# Patient Record
Sex: FEMALE | Race: WHITE | NOT HISPANIC OR LATINO | URBAN - METROPOLITAN AREA
[De-identification: names, ages, dates, MRNs, and addresses within clinical notes are randomized per-mention and may not be internally consistent; named-entity substitution may affect disease eponyms.]

---

## 2019-03-10 ENCOUNTER — EMERGENCY (EMERGENCY)
Facility: HOSPITAL | Age: 64
LOS: 1 days | Discharge: HOME | End: 2019-03-10
Admitting: EMERGENCY MEDICINE
Payer: COMMERCIAL

## 2019-03-10 VITALS
DIASTOLIC BLOOD PRESSURE: 68 MMHG | HEART RATE: 65 BPM | RESPIRATION RATE: 16 BRPM | TEMPERATURE: 99 F | OXYGEN SATURATION: 98 % | SYSTOLIC BLOOD PRESSURE: 109 MMHG

## 2019-03-10 DIAGNOSIS — G44.309 POST-TRAUMATIC HEADACHE, UNSPECIFIED, NOT INTRACTABLE: ICD-10-CM

## 2019-03-10 DIAGNOSIS — Z88.1 ALLERGY STATUS TO OTHER ANTIBIOTIC AGENTS STATUS: ICD-10-CM

## 2019-03-10 DIAGNOSIS — M54.2 CERVICALGIA: ICD-10-CM

## 2019-03-10 DIAGNOSIS — R51 HEADACHE: ICD-10-CM

## 2019-03-10 PROCEDURE — 70450 CT HEAD/BRAIN W/O DYE: CPT | Mod: 26

## 2019-03-10 PROCEDURE — 72125 CT NECK SPINE W/O DYE: CPT | Mod: 26

## 2019-03-10 PROCEDURE — 99284 EMERGENCY DEPT VISIT MOD MDM: CPT | Mod: 25

## 2019-03-10 PROCEDURE — 99284 EMERGENCY DEPT VISIT MOD MDM: CPT

## 2019-03-10 PROCEDURE — 70450 CT HEAD/BRAIN W/O DYE: CPT

## 2019-03-10 PROCEDURE — 72125 CT NECK SPINE W/O DYE: CPT

## 2019-03-10 RX ORDER — ACETAMINOPHEN 500 MG
1000 TABLET ORAL ONCE
Qty: 0 | Refills: 0 | Status: COMPLETED | OUTPATIENT
Start: 2019-03-10 | End: 2019-03-10

## 2019-03-10 RX ADMIN — Medication 1000 MILLIGRAM(S): at 19:07
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
## 2019-03-10 NOTE — ED PROVIDER NOTE - NSFOLLOWUPINSTRUCTIONS_ED_ALL_ED_FT
Please arrange for follow up with an endocrinologist.  If you decide to arrange for it in [facility], please call the ED REFERRAL COORDINATOR for assistance tomorrow.  phone [phone]  Return to ED for any new/worsening symptoms, or any concerns.    ______________________________________________________________________  Post-Concussion Syndrome  Post-concussion syndrome describes the symptoms that can occur after a head injury. These symptoms can last from weeks to months.    What are the causes?  It is not clear why some head injuries cause post-concussion syndrome. It can occur whether your head injury was mild or severe and whether you were wearing head protection or not.    What are the signs or symptoms?  Memory difficulties.  Dizziness.  Headaches.  Double vision or blurry vision.  Sensitivity to light.  Hearing difficulties.  Depression.  Tiredness.  Weakness.  Difficulty with concentration.  Difficulty sleeping or staying asleep.  Vomiting.  Poor balance or instability on your feet.  Slow reaction time.  ImageDifficulty learning and remembering things you have heard.  How is this diagnosed?  There is no test to determine whether you have post-concussion syndrome. Your health care provider may order an imaging scan of your brain, such as a CT scan, to check for other problems that may be causing your symptoms (such as a severe injury inside your skull).    How is this treated?  Usually, these problems disappear over time without medical care. Your health care provider may prescribe medicine to help ease your symptoms. It is important to follow up with a neurologist to evaluate your recovery and address any lingering symptoms or issues.    Follow these instructions at home:  Take medicines only as directed by your health care provider. Do not take aspirin. Aspirin can slow blood clotting.  Sleep with your head slightly elevated to help with headaches.  Avoid any situation where there is potential for another head injury. This includes football, hockey, soccer, basketball, martial arts, downhill snow sports, and horseback riding. Your condition will get worse every time you experience a concussion. You should avoid these activities until you are evaluated by the appropriate follow-up health care providers.  Keep all follow-up visits as directed by your health care provider. This is important.  Contact a health care provider if:  You have increased problems paying attention or concentrating.  You have increased difficulty remembering or learning new information.  You need more time to complete tasks or assignments than before.  You have increased irritability or decreased ability to cope with stress.  You have more symptoms than before.  Seek medical care if you have any of the following symptoms for more than two weeks after your injury:    Lasting (chronic) headaches.  Dizziness or balance problems.  Nausea.  Vision problems.  Increased sensitivity to noise or light.  Depression or mood swings.  Anxiety or irritability.  Memory problems.  Difficulty concentrating or paying attention.  Sleep problems.  Feeling tired all the time.    Get help right away if:  You have confusion or unusual drowsiness.  Others find it difficult to wake you up.  You have nausea or persistent, forceful vomiting.  You feel like you are moving when you are not (vertigo). Your eyes may move rapidly back and forth.  You have convulsions or faint.  You have severe, persistent headaches that are not relieved by medicine.  You cannot use your arms or legs normally.  One of your pupils is larger than the other.  You have clear or bloody discharge from your nose or ears.  Your problems are getting worse, not better.  This information is not intended to replace advice given to you by your health care provider. Make sure you discuss any questions you have with your health care provider.  _________________________________________________________  THYROID NODULES - AfterCare(R) Instructions(ER/ED)     Thyroid Nodules    WHAT YOU NEED TO KNOW:    Thyroid nodules are growths on your thyroid gland. Your thyroid makes hormones that help control your body temperature, heart rate, and growth. The hormones also control how fast your body uses food for energy. Some nodules are lumps of tissue, and others are filled with fluid.     DISCHARGE INSTRUCTIONS:    Medicines:     Thyroid medicine is given to bring your thyroid hormone levels back to a normal range.       Take your medicine as directed. Contact your healthcare provider if you think your medicine is not helping or if you have side effects. Tell him or her if you are allergic to any medicine. Keep a list of the medicines, vitamins, and herbs you take. Include the amounts, and when and why you take them. Bring the list or the pill bottles to follow-up visits. Carry your medicine list with you in case of an emergency.    Follow up with your healthcare provider as directed: You may need frequent blood tests to check your thyroid hormone levels. You may also need tests such as an ultrasound to check if any nodules are growing or have returned. Write down your questions so you remember to ask them during your visits.    Eat iodine-rich foods: Examples include fish, seaweed, dairy products, eggs, beans, and lean meat. Iodized salt also contains iodine. You may need to use iodized table salt when you cook and season your food. Iodine may be added to bread or to your drinking water. Ask for a list of foods that contain iodine, and ask how much iodine you need each day.    Contact your healthcare provider if:     You have a new cough that does not improve.      You begin choking or have new or increased trouble swallowing.       Your voice becomes hoarse.      You are losing weight without trying.      You have questions or concerns about your condition or care.    Return to the emergency department if:     You have sudden chest pain or trouble breathing.      Your symptoms worsen, even after you take your medicines.

## 2019-03-10 NOTE — ED ADULT NURSE NOTE - CHPI ED NUR SYMPTOMS NEG
no change in level of consciousness/no nausea/no vomiting/no blurred vision/no confusion/no fever/no numbness/no weakness/no loss of consciousness

## 2019-03-10 NOTE — ED PROVIDER NOTE - PHYSICAL EXAMINATION
CONSTITUTIONAL: WD,WN. NAD.    SKIN: Normal color and turgor. No rash.    HEAD: NC/AT.  EYES: Conjunctiva clear. EOMI. PERRL.    ENT: Airway patent, OP without erythema, tonsillar swelling or exudate; uvula midline without swelling. Nasal mucosa clear, no rhinorrhea.  No otorrhea or hemotympanum.  RESPIRATORY:  Breathing non-labored. No retractions or accessory muscle use.  Lungs CTA bilat.  CARDIOVASCULAR:  RRR, S1S2. No M/R/G.   No neck bruit.   GI:  Abdomen soft, nontender.    MSK: Neck supple with FROM.  No midline neck tenderness.  No extremity edema or tenderness.  No joint swelling or ROM limitation.  NEURO: Alert and oriented; CN II-XII intact. Speech clear. 5/5 strength in all extremities.  Normal balance and gait. Romberg neg.  F-N normal.
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
## 2019-03-10 NOTE — ED PROVIDER NOTE - NS ED ROS FT
NEURO: No dizziness, no numbness in face or extremities  EYE: no change in vision or eye injury   ENT: no epistaxis, rhinorrhea, otorrhea, dental injury; tinnitus or hearing loss   PULM: no difficulty breathing   CV: no chest pain or palpitations; no syncope   GI: no abdominal pain or vomiting   : no pelvic pain, no genital injury   MSK: HPI  SKIN: no abrasions or lacerations

## 2019-12-14 ENCOUNTER — OFFICE VISIT (OUTPATIENT)
Dept: URGENT CARE | Facility: CLINIC | Age: 64
End: 2019-12-14
Payer: COMMERCIAL

## 2019-12-14 VITALS
OXYGEN SATURATION: 95 % | WEIGHT: 120 LBS | HEART RATE: 78 BPM | HEIGHT: 64 IN | BODY MASS INDEX: 20.49 KG/M2 | RESPIRATION RATE: 16 BRPM | TEMPERATURE: 98.7 F | SYSTOLIC BLOOD PRESSURE: 120 MMHG | DIASTOLIC BLOOD PRESSURE: 70 MMHG

## 2019-12-14 DIAGNOSIS — R21 RASH: ICD-10-CM

## 2019-12-14 PROCEDURE — 99203 OFFICE O/P NEW LOW 30 MIN: CPT | Performed by: PHYSICIAN ASSISTANT

## 2019-12-14 RX ORDER — ERGOCALCIFEROL 1.25 MG/1
CAPSULE ORAL
COMMUNITY
Start: 2017-03-22 | End: 2021-03-21

## 2019-12-14 RX ORDER — RANITIDINE 300 MG/1
300 TABLET ORAL
COMMUNITY
Start: 2019-04-24

## 2019-12-14 RX ORDER — PHENOL 1.4 %
600 AEROSOL, SPRAY (ML) MUCOUS MEMBRANE
COMMUNITY

## 2019-12-14 RX ORDER — MULTIVIT WITH MINERALS/LUTEIN
1 TABLET ORAL
COMMUNITY

## 2019-12-14 RX ORDER — TRIAMCINOLONE ACETONIDE 55 UG/1
SPRAY, METERED NASAL
COMMUNITY
Start: 2019-10-07

## 2019-12-14 RX ORDER — HYDROXYZINE HYDROCHLORIDE 25 MG/1
25 TABLET, FILM COATED ORAL 2 TIMES DAILY PRN
Qty: 30 TAB | Refills: 0 | Status: SHIPPED | OUTPATIENT
Start: 2019-12-14

## 2019-12-14 RX ORDER — TRIAMCINOLONE ACETONIDE 55 UG/1
2 SPRAY, METERED NASAL
COMMUNITY
Start: 2019-10-07

## 2019-12-14 RX ORDER — TRIAMCINOLONE ACETONIDE 1 MG/G
1 CREAM TOPICAL 2 TIMES DAILY
Qty: 1 TUBE | Refills: 1 | Status: SHIPPED | OUTPATIENT
Start: 2019-12-14

## 2019-12-14 RX ORDER — FUROSEMIDE 20 MG/1
10 TABLET ORAL
COMMUNITY
Start: 2018-07-30

## 2019-12-14 RX ORDER — FLUTICASONE PROPIONATE 50 MCG
SPRAY, SUSPENSION (ML) NASAL
COMMUNITY
Start: 2016-05-23 | End: 2020-05-22

## 2019-12-14 RX ORDER — DEXAMETHASONE 4 MG/1
TABLET ORAL
COMMUNITY
Start: 2017-03-06

## 2019-12-14 ASSESSMENT — ENCOUNTER SYMPTOMS
COUGH: 0
TINGLING: 0
NAUSEA: 0
FATIGUE: 0
SHORTNESS OF BREATH: 0
CHILLS: 0
FOCAL WEAKNESS: 0
MYALGIAS: 0
VOMITING: 0
SENSORY CHANGE: 0
HEADACHES: 0
FEVER: 0

## 2019-12-15 NOTE — PROGRESS NOTES
"Subjective:      Judie Currie is a 64 y.o. female who presents with Bug Bite (bed bugs )            Rash   This is a new problem. Episode onset: 3-4 days ago. Patient states she was exposed to bed bugs at local hotel ([place]) The problem is unchanged. The rash is diffuse (back, legs, chest ). The rash is characterized by itchiness and redness. She was exposed to an insect bite/sting (possible bed bugs.). Pertinent negatives include no cough, facial edema, fatigue, fever, shortness of breath or vomiting. (No chills) Past treatments include nothing.     The patient states she never saw a bug. She states she was moved to a different room. Her clothes and bags were all sent to the laundry. She states she has not had any more bumps.    No past medical history on file.    No past surgical history on file.    No family history on file.    Allergies   Allergen Reactions   • Chlorhexidine Rash   • Zpak  [Azithromycin] Nausea and Hives     DIZZINESS       Medications, Allergies, and current problem list reviewed today in Epic      Review of Systems   Constitutional: Negative for chills, fatigue, fever and malaise/fatigue.   Respiratory: Negative for cough and shortness of breath.    Gastrointestinal: Negative for nausea and vomiting.   Musculoskeletal: Negative for myalgias.   Skin: Positive for itching and rash.   Neurological: Negative for tingling, sensory change, focal weakness and headaches.     All other systems reviewed and are negative.        Objective:     /70   Pulse 78   Temp 37.1 °C (98.7 °F) (Temporal)   Resp 16   Ht 1.626 m (5' 4\")   Wt 54.4 kg (120 lb)   SpO2 95%   BMI 20.60 kg/m²      Physical Exam  Constitutional:       General: She is not in acute distress.  HENT:      Head: Normocephalic and atraumatic.   Eyes:      Conjunctiva/sclera: Conjunctivae normal.   Cardiovascular:      Rate and Rhythm: Normal rate.   Pulmonary:      Effort: Pulmonary effort is normal. No respiratory distress. "   Skin:     General: Skin is warm.      Findings: Rash present. Rash is papular.          Neurological:      General: No focal deficit present.      Mental Status: She is alert and oriented to person, place, and time.   Psychiatric:         Mood and Affect: Mood normal.         Behavior: Behavior normal.         Thought Content: Thought content normal.         Judgment: Judgment normal.                 Assessment/Plan:       1. Rash    Rash is consistent with bed bug bites.    DDX includes irritant dermatitis.     - triamcinolone acetonide (KENALOG) 0.1 % Cream; Apply 1 Application to affected area(s) 2 times a day. For up to two weeks.  Dispense: 1 Tube; Refill: 1. Do not use on face, groin or armpits.     - hydrOXYzine HCl (ATARAX) 25 MG Tab; Take 1 Tab by mouth 2 times a day as needed for Itching.  Dispense: 30 Tab; Refill: 0  - mupirocin (BACTROBAN) 2 % Ointment; Apply 1 Application to affected area(s) 2 times a day. To left leg  Dispense: 1 Tube; Refill: 0 for papule on leg.     Differential diagnoses, Supportive care, and indications for immediate follow-up discussed with patient.   Instructed to return to clinic or nearest emergency department for any change in condition, further concerns, or worsening of symptoms.  .  The patient demonstrated a good understanding and agreed with the treatment plan.      Chayito Holcomb P.A.-C.

## 2019-12-16 ENCOUNTER — TELEPHONE (OUTPATIENT)
Dept: URGENT CARE | Facility: CLINIC | Age: 64
End: 2019-12-16

## 2019-12-17 NOTE — TELEPHONE ENCOUNTER
Patient came in today saying that she was seen here yesterday when she originally was seen on 12/14/2019. Pt came in because she wanted her after visit summary. Once she got it she was upset because her diagnosis was not listed anywhere in the after visit summary. I then printed her the visit diagnosis and the progress note where it has what she was here for. The patient signed a release of information sheet and when she saw the diagnosis she got even more upset because it said a rash and not bed bugs. Pt stated that the diagnosis code and the progress note was not enough and that she needed her diagnosis on her after visit summary form stating that she has bed bugs.

## 2021-09-06 ENCOUNTER — HOSPITAL ENCOUNTER (EMERGENCY)
Dept: HOSPITAL 8 - ED | Age: 66
Discharge: HOME | End: 2021-09-06
Payer: MEDICARE

## 2021-09-06 VITALS — WEIGHT: 126.1 LBS | BODY MASS INDEX: 21.53 KG/M2 | HEIGHT: 64 IN

## 2021-09-06 VITALS — DIASTOLIC BLOOD PRESSURE: 65 MMHG | SYSTOLIC BLOOD PRESSURE: 103 MMHG

## 2021-09-06 DIAGNOSIS — U07.1: Primary | ICD-10-CM

## 2021-09-06 DIAGNOSIS — R06.02: ICD-10-CM

## 2021-09-06 LAB
ALBUMIN SERPL-MCNC: 3.7 G/DL (ref 3.4–5)
ANION GAP SERPL CALC-SCNC: 4 MMOL/L (ref 5–15)
BASOPHILS # BLD AUTO: 0 X10^3/UL (ref 0–0.1)
BASOPHILS NFR BLD AUTO: 1 % (ref 0–1)
CALCIUM SERPL-MCNC: 8.8 MG/DL (ref 8.5–10.1)
CHLORIDE SERPL-SCNC: 107 MMOL/L (ref 98–107)
CREAT SERPL-MCNC: 0.73 MG/DL (ref 0.55–1.02)
EOSINOPHIL # BLD AUTO: 0 X10^3/UL (ref 0–0.4)
EOSINOPHIL NFR BLD AUTO: 1 % (ref 1–7)
ERYTHROCYTE [DISTWIDTH] IN BLOOD BY AUTOMATED COUNT: 13.5 % (ref 9.6–15.2)
LYMPHOCYTES # BLD AUTO: 2.8 X10^3/UL (ref 1–3.4)
LYMPHOCYTES NFR BLD AUTO: 56 % (ref 22–44)
MCH RBC QN AUTO: 31.5 PG (ref 27–34.8)
MCHC RBC AUTO-ENTMCNC: 33.8 G/DL (ref 32.4–35.8)
MONOCYTES # BLD AUTO: 0.4 X10^3/UL (ref 0.2–0.8)
MONOCYTES NFR BLD AUTO: 9 % (ref 2–9)
NEUTROPHILS # BLD AUTO: 1.7 X10^3/UL (ref 1.8–6.8)
NEUTROPHILS NFR BLD AUTO: 34 % (ref 42–75)
PLATELET # BLD AUTO: 380 X10^3/UL (ref 130–400)
PMV BLD AUTO: 8.3 FL (ref 7.4–10.4)
RBC # BLD AUTO: 4.78 X10^6/UL (ref 3.82–5.3)

## 2021-09-06 PROCEDURE — M0243 CASIRIVI AND IMDEVI INFUSION: HCPCS

## 2021-09-06 PROCEDURE — 85025 COMPLETE CBC W/AUTO DIFF WBC: CPT

## 2021-09-06 PROCEDURE — 36415 COLL VENOUS BLD VENIPUNCTURE: CPT

## 2021-09-06 PROCEDURE — 93005 ELECTROCARDIOGRAM TRACING: CPT

## 2021-09-06 PROCEDURE — 71045 X-RAY EXAM CHEST 1 VIEW: CPT

## 2021-09-06 PROCEDURE — 82040 ASSAY OF SERUM ALBUMIN: CPT

## 2021-09-06 PROCEDURE — 80048 BASIC METABOLIC PNL TOTAL CA: CPT

## 2021-09-06 PROCEDURE — 96374 THER/PROPH/DIAG INJ IV PUSH: CPT

## 2021-09-06 PROCEDURE — 99285 EMERGENCY DEPT VISIT HI MDM: CPT

## 2021-09-06 NOTE — NUR
PT MEDICATED PER EMAR. PT TOLERATED INFUSION WELL. PT RESTING IN [place], 
MONITORING IN PLACE, WILL OBSERVE FOR 1 HOUR POST INFUSTION.

## 2021-09-06 NOTE — NUR
PT BIB SELF VIA POV. PER PT "I'M HERE TO GET REGENERON, I WAS TOLD I HAVE COVID 
AND AM NOT GETTING BETTER". PT STATES SHE HAD MODERNA X1 BUT HAD A RXN AND 
DIDN'T GET THE SECOND ONE. PT RESTING IN [facility] [place], MONITORING IN PLACE, NADN AT 
THIS TIME, TERESA.

## 2021-09-10 ENCOUNTER — HOSPITAL ENCOUNTER (EMERGENCY)
Dept: HOSPITAL 8 - ED | Age: 66
LOS: 1 days | Discharge: HOME | End: 2021-09-11
Payer: MEDICARE

## 2021-09-10 VITALS — BODY MASS INDEX: 21.08 KG/M2 | HEIGHT: 64 IN | WEIGHT: 123.46 LBS
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
## 2021-09-10 DIAGNOSIS — S06.9X1A: Primary | ICD-10-CM
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
## 2021-09-10 DIAGNOSIS — G44.319: ICD-10-CM

## 2021-09-10 DIAGNOSIS — K29.00: ICD-10-CM

## 2021-09-10 LAB
ALBUMIN SERPL-MCNC: 3.6 G/DL (ref 3.4–5)
ALP SERPL-CCNC: 48 U/L (ref 45–117)
ALT SERPL-CCNC: 46 U/L (ref 12–78)
ANION GAP SERPL CALC-SCNC: 8 MMOL/L (ref 5–15)
BASOPHILS # BLD AUTO: 0 X10^3/UL (ref 0–0.1)
BASOPHILS NFR BLD AUTO: 0 % (ref 0–1)
BILIRUB SERPL-MCNC: 0.5 MG/DL (ref 0.2–1)
CALCIUM SERPL-MCNC: 9.1 MG/DL (ref 8.5–10.1)
CHLORIDE SERPL-SCNC: 104 MMOL/L (ref 98–107)
CREAT SERPL-MCNC: 0.78 MG/DL (ref 0.55–1.02)
EOSINOPHIL # BLD AUTO: 0 X10^3/UL (ref 0–0.4)
EOSINOPHIL NFR BLD AUTO: 0 % (ref 1–7)
ERYTHROCYTE [DISTWIDTH] IN BLOOD BY AUTOMATED COUNT: 13.4 % (ref 9.6–15.2)
LYMPHOCYTES # BLD AUTO: 2.8 X10^3/UL (ref 1–3.4)
LYMPHOCYTES NFR BLD AUTO: 22 % (ref 22–44)
MCH RBC QN AUTO: 31.3 PG (ref 27–34.8)
MCHC RBC AUTO-ENTMCNC: 34 G/DL (ref 32.4–35.8)
MONOCYTES # BLD AUTO: 0.7 X10^3/UL (ref 0.2–0.8)
MONOCYTES NFR BLD AUTO: 6 % (ref 2–9)
NEUTROPHILS # BLD AUTO: 8.9 X10^3/UL (ref 1.8–6.8)
NEUTROPHILS NFR BLD AUTO: 71 % (ref 42–75)
PLATELET # BLD AUTO: 514 X10^3/UL (ref 130–400)
PMV BLD AUTO: 8.2 FL (ref 7.4–10.4)
PROT SERPL-MCNC: 7.7 G/DL (ref 6.4–8.2)
RBC # BLD AUTO: 4.99 X10^6/UL (ref 3.82–5.3)
TROPONIN I SERPL-MCNC: < 0.015 NG/ML (ref 0–0.04)

## 2021-09-10 PROCEDURE — 84484 ASSAY OF TROPONIN QUANT: CPT

## 2021-09-10 PROCEDURE — 71045 X-RAY EXAM CHEST 1 VIEW: CPT

## 2021-09-10 PROCEDURE — 80053 COMPREHEN METABOLIC PANEL: CPT

## 2021-09-10 PROCEDURE — 70450 CT HEAD/BRAIN W/O DYE: CPT

## 2021-09-10 PROCEDURE — 85025 COMPLETE CBC W/AUTO DIFF WBC: CPT

## 2021-09-10 PROCEDURE — 72125 CT NECK SPINE W/O DYE: CPT

## 2021-09-10 PROCEDURE — 93005 ELECTROCARDIOGRAM TRACING: CPT

## 2021-09-10 PROCEDURE — 99285 EMERGENCY DEPT VISIT HI MDM: CPT

## 2021-09-10 PROCEDURE — 36415 COLL VENOUS BLD VENIPUNCTURE: CPT

## 2021-09-11 VITALS — DIASTOLIC BLOOD PRESSURE: 67 MMHG | SYSTOLIC BLOOD PRESSURE: 111 MMHG

## 2022-06-06 NOTE — ED ADULT NURSE NOTE - NSFALLRSKPASTHIST_ED_ALL_ED
Call for fevers> 101 , changes to vision or gait, bleeding, bruising, fatigue, pallor, nausea/vomiting    We'll plan for MRI in mid-July     Return in 3-4 weeks for CBC, CMP, mag, phos   no